# Patient Record
Sex: FEMALE | Race: WHITE | NOT HISPANIC OR LATINO | Employment: FULL TIME | URBAN - METROPOLITAN AREA
[De-identification: names, ages, dates, MRNs, and addresses within clinical notes are randomized per-mention and may not be internally consistent; named-entity substitution may affect disease eponyms.]

---

## 2017-01-26 ENCOUNTER — OFFICE VISIT (OUTPATIENT)
Dept: SLEEP MEDICINE | Facility: CLINIC | Age: 42
End: 2017-01-26
Payer: COMMERCIAL

## 2017-01-26 ENCOUNTER — LAB VISIT (OUTPATIENT)
Dept: LAB | Facility: OTHER | Age: 42
End: 2017-01-26
Attending: NURSE PRACTITIONER
Payer: MEDICAID

## 2017-01-26 ENCOUNTER — HOSPITAL ENCOUNTER (EMERGENCY)
Facility: OTHER | Age: 42
Discharge: HOME | End: 2017-01-26
Attending: EMERGENCY MEDICINE
Payer: MEDICAID

## 2017-01-26 VITALS
WEIGHT: 149.94 LBS | HEART RATE: 72 BPM | BODY MASS INDEX: 27.59 KG/M2 | SYSTOLIC BLOOD PRESSURE: 126 MMHG | HEIGHT: 62 IN | DIASTOLIC BLOOD PRESSURE: 82 MMHG

## 2017-01-26 VITALS
OXYGEN SATURATION: 99 % | HEART RATE: 78 BPM | HEIGHT: 62 IN | SYSTOLIC BLOOD PRESSURE: 127 MMHG | BODY MASS INDEX: 26.13 KG/M2 | DIASTOLIC BLOOD PRESSURE: 72 MMHG | WEIGHT: 142 LBS | RESPIRATION RATE: 18 BRPM | TEMPERATURE: 98 F

## 2017-01-26 DIAGNOSIS — E07.9 THYROID DISEASE: ICD-10-CM

## 2017-01-26 DIAGNOSIS — Z86.018 HISTORY OF UTERINE FIBROID: ICD-10-CM

## 2017-01-26 DIAGNOSIS — D50.9 IRON DEFICIENCY ANEMIA, UNSPECIFIED IRON DEFICIENCY ANEMIA TYPE: ICD-10-CM

## 2017-01-26 DIAGNOSIS — G43.009 NONINTRACTABLE MIGRAINE, UNSPECIFIED MIGRAINE TYPE: ICD-10-CM

## 2017-01-26 DIAGNOSIS — N95.9 UNSPECIFIED MENOPAUSAL AND POSTMENOPAUSAL DISORDER: Primary | ICD-10-CM

## 2017-01-26 DIAGNOSIS — Z87.42 HISTORY OF OVARIAN CYST: ICD-10-CM

## 2017-01-26 DIAGNOSIS — G43.101 MIGRAINE WITH AURA AND WITH STATUS MIGRAINOSUS, NOT INTRACTABLE: ICD-10-CM

## 2017-01-26 DIAGNOSIS — E07.9 THYROID DISEASE: Primary | ICD-10-CM

## 2017-01-26 DIAGNOSIS — G47.30 UNSPECIFIED SLEEP APNEA: ICD-10-CM

## 2017-01-26 LAB
25(OH)D3+25(OH)D2 SERPL-MCNC: 22 NG/ML
ALBUMIN SERPL BCP-MCNC: 4.1 G/DL
ALP SERPL-CCNC: 68 U/L
ALT SERPL W/O P-5'-P-CCNC: 10 U/L
ANION GAP SERPL CALC-SCNC: 8 MMOL/L
AST SERPL-CCNC: 14 U/L
BASOPHILS # BLD AUTO: 0.01 K/UL
BASOPHILS NFR BLD: 0.2 %
BILIRUB SERPL-MCNC: 0.3 MG/DL
BUN SERPL-MCNC: 13 MG/DL
CALCIUM SERPL-MCNC: 9.1 MG/DL
CHLORIDE SERPL-SCNC: 103 MMOL/L
CO2 SERPL-SCNC: 26 MMOL/L
CREAT SERPL-MCNC: 0.8 MG/DL
DIFFERENTIAL METHOD: ABNORMAL
EOSINOPHIL # BLD AUTO: 0.1 K/UL
EOSINOPHIL NFR BLD: 1.3 %
ERYTHROCYTE [DISTWIDTH] IN BLOOD BY AUTOMATED COUNT: 18.2 %
EST. GFR  (AFRICAN AMERICAN): >60 ML/MIN/1.73 M^2
EST. GFR  (NON AFRICAN AMERICAN): >60 ML/MIN/1.73 M^2
GLUCOSE SERPL-MCNC: 88 MG/DL
HCT VFR BLD AUTO: 35.8 %
HGB BLD-MCNC: 10.5 G/DL
LYMPHOCYTES # BLD AUTO: 1.8 K/UL
LYMPHOCYTES NFR BLD: 28.3 %
MCH RBC QN AUTO: 20.6 PG
MCHC RBC AUTO-ENTMCNC: 29.3 %
MCV RBC AUTO: 70 FL
MONOCYTES # BLD AUTO: 0.8 K/UL
MONOCYTES NFR BLD: 12 %
NEUTROPHILS # BLD AUTO: 3.6 K/UL
NEUTROPHILS NFR BLD: 58 %
PLATELET # BLD AUTO: 457 K/UL
PMV BLD AUTO: 9.8 FL
POTASSIUM SERPL-SCNC: 4 MMOL/L
PROT SERPL-MCNC: 7.2 G/DL
RBC # BLD AUTO: 5.09 M/UL
SODIUM SERPL-SCNC: 137 MMOL/L
TSH SERPL DL<=0.005 MIU/L-ACNC: 1.37 UIU/ML
WBC # BLD AUTO: 6.26 K/UL

## 2017-01-26 PROCEDURE — 80053 COMPREHEN METABOLIC PANEL: CPT

## 2017-01-26 PROCEDURE — 1159F MED LIST DOCD IN RCRD: CPT | Mod: S$GLB,,, | Performed by: NURSE PRACTITIONER

## 2017-01-26 PROCEDURE — 99283 EMERGENCY DEPT VISIT LOW MDM: CPT

## 2017-01-26 PROCEDURE — 99204 OFFICE O/P NEW MOD 45 MIN: CPT | Mod: S$GLB,,, | Performed by: NURSE PRACTITIONER

## 2017-01-26 PROCEDURE — 99999 PR PBB SHADOW E&M-EST. PATIENT-LVL IV: CPT | Mod: PBBFAC,,, | Performed by: NURSE PRACTITIONER

## 2017-01-26 PROCEDURE — 82306 VITAMIN D 25 HYDROXY: CPT

## 2017-01-26 PROCEDURE — 85025 COMPLETE CBC W/AUTO DIFF WBC: CPT

## 2017-01-26 PROCEDURE — 36415 COLL VENOUS BLD VENIPUNCTURE: CPT

## 2017-01-26 PROCEDURE — 84443 ASSAY THYROID STIM HORMONE: CPT

## 2017-01-26 RX ORDER — TOPIRAMATE 25 MG/1
25 TABLET ORAL AS DIRECTED
Qty: 90 TABLET | Refills: 1 | Status: SHIPPED | OUTPATIENT
Start: 2017-01-26 | End: 2019-05-05

## 2017-01-26 RX ORDER — BUTALBITAL, ACETAMINOPHEN AND CAFFEINE 50; 325; 40 MG/1; MG/1; MG/1
1 TABLET ORAL EVERY 6 HOURS PRN
Qty: 12 TABLET | Refills: 0 | Status: SHIPPED | OUTPATIENT
Start: 2017-01-26 | End: 2017-02-25

## 2017-01-26 RX ORDER — IBUPROFEN 800 MG/1
800 TABLET ORAL EVERY 6 HOURS PRN
Qty: 12 TABLET | Refills: 0 | Status: SHIPPED | OUTPATIENT
Start: 2017-01-26

## 2017-01-26 RX ORDER — ONDANSETRON 8 MG/1
8 TABLET, ORALLY DISINTEGRATING ORAL EVERY 12 HOURS PRN
Qty: 30 TABLET | Refills: 3 | Status: SHIPPED | OUTPATIENT
Start: 2017-01-26 | End: 2017-02-25

## 2017-01-26 RX ORDER — KETOPROFEN 75 MG/1
75 CAPSULE ORAL EVERY 8 HOURS PRN
Qty: 30 CAPSULE | Refills: 3 | Status: SHIPPED | OUTPATIENT
Start: 2017-01-26 | End: 2017-02-25

## 2017-01-26 NOTE — ED AVS SNAPSHOT
[facility]  [address] [facility] [address]               Crista Reyes Diamond   2017  7:01 AM   ED    Description:  Female : 1975   Department:  [facility]           Your Care was Coordinated By:     Provider Role From To    Emre Gilliland MD Attending Provider 17 0640 --      Reason for Visit     Migraine           Diagnoses this Visit        Comments    Unspecified menopausal and postmenopausal disorder    -  Primary     Nonintractable migraine, unspecified migraine type         History of uterine fibroid         History of ovarian cyst           ED Disposition     None           To Do List           Follow-up Information     Follow up with [facility] In 2 days.    Specialty:  Obstetrics and Gynecology    Contact information:    [address] [place] [id number] [phone]        Follow up with [facility] In 2 days.    Specialty:  Neurology    Contact information:    [address] [place] [id number] [phone]    Additional information:    [facility], 8th Floor, [address]   Please park in [facility].       These Medications        Disp Refills Start End    ibuprofen (ADVIL,MOTRIN) 800 MG tablet 12 tablet 0 2017     Take 1 tablet (800 mg total) by mouth every 6 (six) hours as needed (Migraine Headaches). - Oral    butalbital-acetaminophen-caffeine -40 mg (FIORICET, ESGIC) -40 mg per tablet 12 tablet 0 2017    Take 1 tablet by mouth every 6 (six) hours as needed for Headaches. - Oral      [facility]     [facility] -  Assistance  Registered nurses in the [facility] provide clinical advisement, health education, appointment booking, and other advisory services.  Call for this free service at [phone].             Medications           Message regarding  "Medications     Verify the changes and/or additions to your medication regime listed below are the same as discussed with your clinician today.  If any of these changes or additions are incorrect, please notify your healthcare provider.        START taking these NEW medications        Refills    ibuprofen (ADVIL,MOTRIN) 800 MG tablet 0    Sig: Take 1 tablet (800 mg total) by mouth every 6 (six) hours as needed (Migraine Headaches).    Class: Print    Route: Oral    butalbital-acetaminophen-caffeine -40 mg (FIORICET, ESGIC) -40 mg per tablet 0    Sig: Take 1 tablet by mouth every 6 (six) hours as needed for Headaches.    Class: Print    Route: Oral      STOP taking these medications     ondansetron (ZOFRAN) 4 MG tablet Take 1 tablet (4 mg total) by mouth every 8 (eight) hours as needed for Nausea.           Verify that the below list of medications is an accurate representation of the medications you are currently taking.  If none reported, the list may be blank. If incorrect, please contact your healthcare provider. Carry this list with you in case of emergency.           Current Medications     ALBUTEROL INHL Inhale into the lungs.    butalbital-acetaminophen-caffeine -40 mg (FIORICET, ESGIC) -40 mg per tablet Take 1 tablet by mouth every 6 (six) hours as needed for Headaches.    ibuprofen (ADVIL,MOTRIN) 800 MG tablet Take 1 tablet (800 mg total) by mouth every 6 (six) hours as needed (Migraine Headaches).           Clinical Reference Information           Your Vitals Were     BP Pulse Temp Resp Height Weight    131/85 (BP Location: Left arm, Patient Position: Sitting) 84 98.1 °F (36.7 °C) (Oral) 18 5' 2" (1.575 m) 64.4 kg (142 lb)    Last Period SpO2 BMI          01/19/2017 99% 25.97 kg/m2        Allergies as of 1/26/2017        Reactions    Sulfa (Sulfonamide Antibiotics) Anaphylaxis      Immunizations Administered on Date of Encounter - 1/26/2017     None      ED Micro, Lab, POCT     None "      ED Imaging Orders     None      Discharge References/Attachments     ESTROGEN LEVELS, LOW: EFFECTS OF MENOPAUSE (ENGLISH)    HORMONE THERAPY (HT) FOR WOMEN, DECIDING ABOUT (ENGLISH)    MENOPAUSE, HORMONE CHANGES DURING (ENGLISH)    FIBROIDS, WHAT ARE (ENGLISH)    UTERINE FIBROIDS (ENGLISH)    HEADACHE, MIGRAINE: STAGES AND TREATMENT (ENGLISH)    HEADACHES, SELF-CARE FOR (ENGLISH)    OVARIAN CYSTS, UNDERSTANDING (ENGLISH)    OVARIAN CYSTS, TREATMENT FOR  (ENGLISH)      [facility] Sign-Up     Activating your [facility] account is as easy as 1-2-3!     1) Visit [email or web address], select Sign Up Now, enter this activation code and your date of birth, then select Next.  [email or web address]  Expires: 3/12/2017  7:35 AM      2) Create a username and password to use when you visit [facility] in the future and select a security question in case you lose your password and select Next.    3) Enter your e-mail address and click Sign Up!    Additional Information  If you have questions, please e-mail [email or web address] or call [phone] to talk to our [facility] staff. Remember, [facility] is NOT to be used for urgent needs. For medical emergencies, dial 911.          [facility] complies with applicable Federal civil rights laws and does not discriminate on the basis of race, color, national origin, age, disability, or sex.        Language Assistance Services     ATTENTION: Language assistance services are available, free of charge. Please call [phone].      ATENCIÓN: Si habla kelley, tiene a rivas disposición servicios gratuitos de asistencia lingüística. Llame al [phone].     CHÚ Ý: N?u b?n nói Ti?ng Vi?t, có các d?ch v? h? tr? ngôn ng? mi?n phí dành cho b?n. G?i s? [phone].

## 2017-01-26 NOTE — ED PROVIDER NOTES
"Encounter Date: 1/26/2017    SCRIBE #1 NOTE: I, Jaymie Alvarado, am scribing for, and in the presence of, Dr. Gilliland.       History     Chief Complaint   Patient presents with    Migraine     pt c/o migraine, chills, n/v, abd pain for last months with symptoms worsening.      Review of patient's allergies indicates:   Allergen Reactions    Sulfa (sulfonamide antibiotics) Anaphylaxis     HPI Comments:   Time seen by provider: 7:18 AM    The patient is a 41 y.o. female with uterine fibroids and ovarian cysts who presents to the ED with an acute onset of worsening intermittent HA over the last 3 months with associated chills, nausea, vomiting, and left eye visual changes.  Patient is currently headache free.  Patient states she has no abdominal pain or pelvic pain at this time.  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  The patient is requesting resources were she can find help. The patient denies history of migraines, seeing a PCP for symptoms, poor eating habits, or any other symptoms at this time. No pertinent SHx noted. Sulfa drug allergy noted.    The history is provided by the patient.     Past Medical History   Diagnosis Date    Asthma     Borderline anemia     Fibroids     Fibroids     Functional ovarian cysts      Past Medical History Pertinent Negatives   Diagnosis Date Noted    Diabetes mellitus 1/9/2015    Hypertension 1/9/2015    Renal disorder 1/9/2015    Seizures 1/9/2015     Past Surgical History   Procedure Laterality Date    D&c hysteroscopy with endometrial ablation  2012     Family History   Problem Relation Age of Onset    Emphysema Mother     Hypertension Father     Cancer Sister     Breast cancer Neg Hx     Colon cancer Neg Hx     Ovarian cancer Neg Hx      Social History   Substance Use Topics    Smoking status: Never Smoker    Smokeless tobacco: None    Alcohol use No     Review " of Systems   Constitutional: Positive for chills. Negative for fever.   HENT: Negative for congestion, rhinorrhea, sneezing and sore throat.    Eyes: Positive for visual disturbance.   Respiratory: Negative for cough and shortness of breath.    Cardiovascular: Negative for chest pain and palpitations.   Gastrointestinal: Positive for nausea and vomiting. Negative for abdominal pain and diarrhea.   Genitourinary: Negative for dysuria and hematuria.   Musculoskeletal: Negative for back pain and neck pain.   Skin: Negative for rash.   Neurological: Positive for headaches. Negative for seizures and syncope.     Physical Exam   Initial Vitals   BP Pulse Resp Temp SpO2   01/26/17 0638 01/26/17 0638 01/26/17 0638 01/26/17 0638 01/26/17 0638   131/85 84 18 98.1 °F (36.7 °C) 99 %     Physical Exam    Nursing note and vitals reviewed.  Constitutional: She appears well-developed and well-nourished. She is not diaphoretic. No distress.   HENT:   Head: Normocephalic and atraumatic.   Mouth/Throat: Oropharynx is clear and moist.   Eyes: Conjunctivae and EOM are normal. Pupils are equal, round, and reactive to light. No scleral icterus.   Neck: Normal range of motion. Neck supple.   Cardiovascular: Normal rate, regular rhythm, S1 normal, S2 normal and normal heart sounds. Exam reveals no gallop and no friction rub.    No murmur heard.  Pulmonary/Chest: Breath sounds normal. No respiratory distress. She has no wheezes. She has no rhonchi. She has no rales.   Abdominal: Soft. Bowel sounds are normal. There is no tenderness. There is no rebound and no guarding.   Musculoskeletal: Normal range of motion. She exhibits no edema or tenderness.   No lower extremity edema.    Lymphadenopathy:     She has no cervical adenopathy.   Neurological: She is alert and oriented to person, place, and time.   Skin: Skin is warm and dry. No rash noted. No pallor.   Psychiatric: She has a normal mood and affect. Her behavior is normal. Judgment and  thought content normal.       ED Course   Procedures  Labs Reviewed - No data to display.        Medical Decision Making:   History:   Old Medical Records: I decided to obtain old medical records.            Scribe Attestation:   Scribe #1: I performed the above scribed service and the documentation accurately describes the services I performed. I attest to the accuracy of the note.    Attending Attestation:           Physician Attestation for Scribe:  Physician Attestation Statement for Scribe #1: I, Dr. Gilliland, reviewed documentation, as scribed by Jaymie Alvarado in my presence, and it is both accurate and complete.         Attending ED Notes:   Urgent evaluation a 41-year-old female with past medical history of uterine fibroids, ovarian cyst and intermittent migraine headaches presents the ED with requesting help how to treat all of her symptoms when they occur.  Patient is currently headache free, abdominal pain-free and pelvic pain-free.  Physical exam is benign.  Abdominal exam is benign.  Patient is neurovascular be intact without focal neurologic deficits.PERRLA, EOMI.  Strength 5 of 5 throughout.  Two point discrimination is intact throughout.  Sensation intact to light touch throughout.  Reflexes 2+ throughout.  Good finger to nose task ability. Negative pronator drift.  No papilledema.  No meningeal signs.  The patient is extensively counseled on her diagnosis and treatment, discharged in good condition and directed to follow-up with gynecology, neurology and her PCP in the next 24-48 hours.          ED Course     Clinical Impression:     1. Unspecified menopausal and postmenopausal disorder    2. Nonintractable migraine, unspecified migraine type    3. History of uterine fibroid    4. History of ovarian cyst          Disposition:   Disposition: Discharged  Condition: Stable       Emre Gilliland MD  01/27/17 0598

## 2017-01-26 NOTE — MR AVS SNAPSHOT
[facility], [place], operated by [facility]  [address]  [facility] [address]  Phone: [phone]                  Crista Reyes Diamond   2017 10:00 AM   Office Visit    Description:  Female : 1975   Provider:  Christen Bishop NP   Department:  [facility] [place], operated by [facility]           Diagnoses this Visit        Comments    Thyroid disease    -  Primary     Migraine with aura and with status migrainosus, not intractable         Iron deficiency anemia, unspecified iron deficiency anemia type         Unspecified sleep apnea                To Do List           Future Appointments        Provider Department Dept Phone    2017 11:00 AM LAB, [facility] [phone]    2017 8:45 AM Rosio Elizabeth MD [facility] [place], operated [facility] [address] [phone]    2/3/2017 8:00 AM [facility] MRI1 [address] LIMIT [facility] [phone]    2/3/2017 9:00 AM [facility] MRI1 350 LB LIMIT [facility] [phone]      Goals (5 Years of Data)     None      Follow-Up and Disposition     Return in about 2 months (around 3/28/2017) for 8a headache.       These Medications        Disp Refills Start End    ketoprofen (ORUDIS) 75 MG capsule 30 capsule 3 2017    Take 1 capsule (75 mg total) by mouth every 8 (eight) hours as needed for Pain. - Oral    Pharmacy: [facility] [address] [place] [address] Ph #: [phone]       ondansetron (ZOFRAN-ODT) 8 MG TbDL 30 tablet 3 2017    Take 1 tablet (8 mg total) by mouth every 12 (twelve) hours as needed. - Oral    Pharmacy: [facility] [address] [place] [address] [address] [facility] Ph #: [phone]       topiramate (TOPAMAX) 25 MG tablet 90 tablet 1 2017    Take 1 tablet (25 mg total) by mouth as directed. 1 tab x 7d then 2 tabs qd x 7d, then 3 tabs qd thereafter - Oral    Pharmacy: [id number] - [place], [place] - [address]  [facility] #: [phone]         [facility] On Call     [facility] On Call Nurse Care Line - 24/7 Assistance  Registered nurses in the [facility] provide clinical advisement, health education, appointment booking, and other advisory services.  Call for this free service at [phone].             Medications           Message regarding Medications     Verify the changes and/or additions to your medication regime listed below are the same as discussed with your clinician today.  If any of these changes or additions are incorrect, please notify your healthcare provider.        START taking these NEW medications        Refills    ketoprofen (ORUDIS) 75 MG capsule 3    Sig: Take 1 capsule (75 mg total) by mouth every 8 (eight) hours as needed for Pain.    Class: Normal    Route: Oral    ondansetron (ZOFRAN-ODT) 8 MG TbDL 3    Sig: Take 1 tablet (8 mg total) by mouth every 12 (twelve) hours as needed.    Class: Normal    Route: Oral    topiramate (TOPAMAX) 25 MG tablet 1    Sig: Take 1 tablet (25 mg total) by mouth as directed. 1 tab x 7d then 2 tabs qd x 7d, then 3 tabs qd thereafter    Class: Normal    Route: Oral           Verify that the below list of medications is an accurate representation of the medications you are currently taking.  If none reported, the list may be blank. If incorrect, please contact your healthcare provider. Carry this list with you in case of emergency.           Current Medications     ALBUTEROL INHL Inhale into the lungs.    butalbital-acetaminophen-caffeine -40 mg (FIORICET, ESGIC) -40 mg per tablet Take 1 tablet by mouth every 6 (six) hours as needed for Headaches.    ibuprofen (ADVIL,MOTRIN) 800 MG tablet Take 1 tablet (800 mg total) by mouth every 6 (six) hours as needed (Migraine Headaches).    ketoprofen (ORUDIS) 75 MG capsule Take 1 capsule (75 mg total) by mouth every 8 (eight) hours as needed for Pain.    ondansetron (ZOFRAN-ODT) 8 MG TbDL Take 1 tablet (8  "mg total) by mouth every 12 (twelve) hours as needed.    topiramate (TOPAMAX) 25 MG tablet Take 1 tablet (25 mg total) by mouth as directed. 1 tab x 7d then 2 tabs qd x 7d, then 3 tabs qd thereafter           Clinical Reference Information           Vital Signs - Last Recorded  Most recent update: 1/26/2017  9:51 AM by Emerita Chun MA    BP Pulse Ht Wt LMP BMI    126/82 72 5' 2" (1.575 m) 68 kg (149 lb 14.6 oz) 01/19/2017 27.42 kg/m2      Blood Pressure          Most Recent Value    BP  126/82      Allergies as of 1/26/2017     Sulfa (Sulfonamide Antibiotics)      Immunizations Administered on Date of Encounter - 1/26/2017     None      Orders Placed During Today's Visit     Future Labs/Procedures Expected by Expires    CBC auto differential  1/26/2017 3/27/2018    Comprehensive metabolic panel  1/26/2017 3/27/2018    MRA Brain with contrast  1/26/2017 1/26/2018    MRI Brain W WO Contrast  1/26/2017 1/26/2018    TSH  1/26/2017 3/27/2018    VITAMIN D  1/26/2017 3/27/2018    Home Sleep Studies  As directed 1/26/2018      [facility] Sign-Up     Activating your [facility] account is as easy as 1-2-3!     1) Visit [email or web address], select Sign Up Now, enter this activation code and your date of birth, then select Next.  [email or web address]  Expires: 3/12/2017  7:35 AM      2) Create a username and password to use when you visit [facility] in the future and select a security question in case you lose your password and select Next.    3) Enter your e-mail address and click Sign Up!    Additional Information  If you have questions, please e-mail [email or web address] or call [phone] to talk to our [facility] staff. Remember, [facility] is NOT to be used for urgent needs. For medical emergencies, dial 911.         Instructions      Self-Care for Headaches  Most headaches aren't serious and can be relieved with self-care. But some headaches may be a sign of another health problem like eye trouble or high blood pressure. To find " "the best treatment, learn what kind of headaches you get. For tension headaches, self-care will usually help. To treat migraines, ask your healthcare provider for advice. It is also possible to get both tension and migraine headaches. Self-care involves relieving the pain and avoiding headache triggers if you can.    Ways to reduce pain and tension  Try these steps:  · Apply a cold compress or ice pack to the pain site.  · Drink fluids. If nausea makes it hard to drink, try sucking on ice.  · Rest. Protect yourself from bright light and loud noises.  · Calm your emotions by imagining a peaceful scene.  · Massage tight neck, shoulder, and head muscles.  · To relax muscles, soak in a hot bath or use a hot shower.  Use medicines  Aspirin or aspirin substitutes, such as ibuprofen and acetaminophen, can relieve headache. Remember: Never give aspirin to anyone 18 years old or younger because of the risk of developing Reye syndrome. Use pain medicines only when necessary.  Track your headaches  Keeping a headache diary can help you and your healthcare provider identify what's causing your headaches:  · Note when each headache happens.  · Identify your activities and the foods you've eaten 6 to 8 hours before the headache began.  · Look for any trends or "triggers."  Signs of tension headache  Any of the following can be signs:  · Dull pain or feeling of pressure in a tight band around your head  · Pain in your neck or shoulders  · Headache without a definite beginning or end  · Headache after an activity such as driving or working on a computer  Signs of migraine  Any of the following can be signs:  · Throbbing pain on one or both sides of your head  · Nausea or vomiting  · Extreme sensitivity to light, sound, and smells  · Bright spots, flashes, or other visual changes  · Pain or nausea so severe that you can't continue your daily activities  Call your healthcare provider   If you have any of the following symptoms, " "contact your healthcare provider:  · A headache that lingers after a recent injury or bump to the head.  · A fever with a stiff neck or pain when you bend your head toward your chest.  · A headache along with slurred speech, changes in your vision, or numbness or weakness in your arms or legs.  · A headache for longer than 3 days.  · Frequent headaches, especially in the morning.  · Headaches with seizures   · Seek immediate medical attention if you have a headache that you would call "the worst headache you have ever had."   © 3663-5515 [employer]. [address] [place] [address]. All rights reserved. This information is not intended as a substitute for professional medical care. Always follow your healthcare professional's instructions.        Migraine Headache  This often severe type of headache is different from other types of headaches in that symptoms other than pain occur with the headache. Nausea and vomiting, lightheadedness, sensitivity to light (photophobia), and other visual disturbances are common migraine symptoms. The pain may last from a few hours to several days. It is not clear why migraines occur but transient factors called triggers can raise the risk of having a migraine attack. A migraine may be triggered by emotional stress or depression, or by hormone changes during the menstrual cycle. Other triggers include birth control pills, overuse of migraine medicines, alcohol or caffeine, foods with tyramine, eye strain, weather changes, missed meals, or too little or too much sleep.  Home care  Follow these tips when taking care of yourself at home:  · Dont drive yourself home if you were given pain medicine for your headache. Instead, have someone else drive you home. Try to sleep when you get home. You should feel much better when you wake up.  · Cold can help ease migraine symptoms. Put an ice pack on your forehead or at the base of your skull. Put heat on the back of " your neck to help ease any neck spasm.  · Drink only clear liquids or eat a light diet until your symptoms get better. This will help you avoid nausea and vomiting.  How to prevent migraines  Pay attention to what seems to trigger your headache. Try to avoid the triggers when you can. If you have frequent headaches, consider keeping a headache diary. In it, write down what you were doing, feeling, or eating in the hours before each headache. Show this to your health care provider to help find the cause of your headaches.  If stress seems to be a trigger for your headaches, figure out what is causing stress in your life. Learn new ways to handle your stress. Ideas include regular exercise, biofeedback, self-hypnosis, and meditation. Talk with your health care provider to find out more information about managing stress. Many books and digital media are also available on this subject.  Tyramine is a substance found in many foods. It can trigger a migraine in some people. These foods contain tyramine:  · Chocolate  · Yogurt  · All cheeses but cottage cheese and cream cheese  · Smoked or pickled fish and meat, including herring, caviar, bologna, pepperoni, and salami  · Liver  · Avocados  · Bananas  · Figs  · Raisins  · Red wine  Try staying away from these foods for 1 to 2 months to see if you have fewer headaches.  How to treat future headaches  · Take time out at the first sign of a headache, if possible. Find a quiet, dark, comfortable place to sit or lie down. Let yourself relax or sleep.  · Put an ice pack on your forehead or on the area of greatest pain. A heating pad and massage may help if you are having a muscle spasm and tightness in your neck.  · If you have been prescribed a medicine to stop a migraine headache, use this at the first warning sign of the headache for best results. First signs may be an aura or pain.  · If you need to take medicine often for your migraine, talk with your healthcare provider  about other ways to prevent your headaches.  Follow-up care  Follow up with your healthcare provider if your headache doesnt get better within the next 24 hours. Talk with your provider if you have frequent headaches. He or she can figure out a treatment plan. Ask if you can have medicine to take at home the next time you get a bad headache. This may keep you from having to visit the emergency department in the future. You may need to see a headache specialist (neurologist) if you continue to have headaches.  When to seek medical advice  Call your healthcare provider right away if any of these occur:  · Your head pain gets worse, or doesnt get better within 24 hours  · You cant keep liquids down (repeated vomiting)  · Pain in your sinuses, ears, or throat  · Fever of 100.4º F (38º C) or higher, or as directed by your healthcare provider  · Stiff neck  · Extreme drowsiness, confusion, or fainting  · Dizziness, or dizziness with spinning sensation (vertigo)  · Weakness in an arm or leg, or on one side of your face  · Difficulty talking or seeing  © 3113-0982 [employer]. [address] [place] [address]. All rights reserved. This information is not intended as a substitute for professional medical care. Always follow your healthcare professional's instructions.        Preventing Migraine Headaches: Medicines and Lifestyle Changes  A migraine is a type of severe headache. Having a migraine can be very painful. But there are steps you can take to help prevent migraines.    Medicines to help prevent migraines  · Your healthcare provider may prescribe certain medicines to help prevent migraines. These medicines may need to be taken daily. Or they may only need to be taken at times when youre likely to have a migraine.  · Common medicines used to help prevent migraines include:  ¨ Triptans (serotonin receptor agonists)  ¨ Nonsteroidal anti-inflammatory drugs (available  over-the-counter)  ¨ Beta-blockers  ¨ Anticonvulsants  ¨ Tricyclic antidepressants  ¨ Calcium channel blockers  ¨ Certain vitamins, minerals, and plant extracts  ¨ Botulinum toxin injection (Botox) for certain chronic migraines   ¨ CGRP (calcitonin gene-related peptide) agnonists are being reviewed by the Food and Drug Administration (FDA)  Lifestyle changes for long-term prevention  Here are some suggestions:  · Exercise. Regular exercise can help prevent migraines and improve your health. (If exercise triggers your migraines, talk to your healthcare provider.)  · Keep regular habits. Dont skip or delay meals. Drink plenty of water. And go to bed and get up at about the same time each day. This includes weekends.  · Try alternative treatments. These are treatments that do not involve the use of medicines or surgery. They may help relieve symptoms and prevent migraines. Some treatment options include biofeedback and acupuncture. Ask your healthcare provider to tell you more about these treatments if you have questions.  · Limit caffeine. You may find that caffeine helps relieve pain during an attack. But too much caffeine can also trigger migraines. So, limit the amount of caffeine you consume.  © 8660-3712 [employer]. [address] [place] [address], [place] [address]. All rights reserved. This information is not intended as a substitute for professional medical care. Always follow your healthcare professional's instructions.

## 2017-01-26 NOTE — PATIENT INSTRUCTIONS
"  Self-Care for Headaches  Most headaches aren't serious and can be relieved with self-care. But some headaches may be a sign of another health problem like eye trouble or high blood pressure. To find the best treatment, learn what kind of headaches you get. For tension headaches, self-care will usually help. To treat migraines, ask your healthcare provider for advice. It is also possible to get both tension and migraine headaches. Self-care involves relieving the pain and avoiding headache triggers if you can.    Ways to reduce pain and tension  Try these steps:  · Apply a cold compress or ice pack to the pain site.  · Drink fluids. If nausea makes it hard to drink, try sucking on ice.  · Rest. Protect yourself from bright light and loud noises.  · Calm your emotions by imagining a peaceful scene.  · Massage tight neck, shoulder, and head muscles.  · To relax muscles, soak in a hot bath or use a hot shower.  Use medicines  Aspirin or aspirin substitutes, such as ibuprofen and acetaminophen, can relieve headache. Remember: Never give aspirin to anyone 18 years old or younger because of the risk of developing Reye syndrome. Use pain medicines only when necessary.  Track your headaches  Keeping a headache diary can help you and your healthcare provider identify what's causing your headaches:  · Note when each headache happens.  · Identify your activities and the foods you've eaten 6 to 8 hours before the headache began.  · Look for any trends or "triggers."  Signs of tension headache  Any of the following can be signs:  · Dull pain or feeling of pressure in a tight band around your head  · Pain in your neck or shoulders  · Headache without a definite beginning or end  · Headache after an activity such as driving or working on a computer  Signs of migraine  Any of the following can be signs:  · Throbbing pain on one or both sides of your head  · Nausea or vomiting  · Extreme sensitivity to light, sound, and " "smells  · Bright spots, flashes, or other visual changes  · Pain or nausea so severe that you can't continue your daily activities  Call your healthcare provider   If you have any of the following symptoms, contact your healthcare provider:  · A headache that lingers after a recent injury or bump to the head.  · A fever with a stiff neck or pain when you bend your head toward your chest.  · A headache along with slurred speech, changes in your vision, or numbness or weakness in your arms or legs.  · A headache for longer than 3 days.  · Frequent headaches, especially in the morning.  · Headaches with seizures   · Seek immediate medical attention if you have a headache that you would call "the worst headache you have ever had."   © 6052-3005 [employer]. [address] [place] [address], [place] [address]. All rights reserved. This information is not intended as a substitute for professional medical care. Always follow your healthcare professional's instructions.        Migraine Headache  This often severe type of headache is different from other types of headaches in that symptoms other than pain occur with the headache. Nausea and vomiting, lightheadedness, sensitivity to light (photophobia), and other visual disturbances are common migraine symptoms. The pain may last from a few hours to several days. It is not clear why migraines occur but transient factors called triggers can raise the risk of having a migraine attack. A migraine may be triggered by emotional stress or depression, or by hormone changes during the menstrual cycle. Other triggers include birth control pills, overuse of migraine medicines, alcohol or caffeine, foods with tyramine, eye strain, weather changes, missed meals, or too little or too much sleep.  Home care  Follow these tips when taking care of yourself at home:  · Dont drive yourself home if you were given pain medicine for your headache. Instead, have someone else drive you home. " Try to sleep when you get home. You should feel much better when you wake up.  · Cold can help ease migraine symptoms. Put an ice pack on your forehead or at the base of your skull. Put heat on the back of your neck to help ease any neck spasm.  · Drink only clear liquids or eat a light diet until your symptoms get better. This will help you avoid nausea and vomiting.  How to prevent migraines  Pay attention to what seems to trigger your headache. Try to avoid the triggers when you can. If you have frequent headaches, consider keeping a headache diary. In it, write down what you were doing, feeling, or eating in the hours before each headache. Show this to your health care provider to help find the cause of your headaches.  If stress seems to be a trigger for your headaches, figure out what is causing stress in your life. Learn new ways to handle your stress. Ideas include regular exercise, biofeedback, self-hypnosis, and meditation. Talk with your health care provider to find out more information about managing stress. Many books and digital media are also available on this subject.  Tyramine is a substance found in many foods. It can trigger a migraine in some people. These foods contain tyramine:  · Chocolate  · Yogurt  · All cheeses but cottage cheese and cream cheese  · Smoked or pickled fish and meat, including herring, caviar, bologna, pepperoni, and salami  · Liver  · Avocados  · Bananas  · Figs  · Raisins  · Red wine  Try staying away from these foods for 1 to 2 months to see if you have fewer headaches.  How to treat future headaches  · Take time out at the first sign of a headache, if possible. Find a quiet, dark, comfortable place to sit or lie down. Let yourself relax or sleep.  · Put an ice pack on your forehead or on the area of greatest pain. A heating pad and massage may help if you are having a muscle spasm and tightness in your neck.  · If you have been prescribed a medicine to stop a migraine  headache, use this at the first warning sign of the headache for best results. First signs may be an aura or pain.  · If you need to take medicine often for your migraine, talk with your healthcare provider about other ways to prevent your headaches.  Follow-up care  Follow up with your healthcare provider if your headache doesnt get better within the next 24 hours. Talk with your provider if you have frequent headaches. He or she can figure out a treatment plan. Ask if you can have medicine to take at home the next time you get a bad headache. This may keep you from having to visit the emergency department in the future. You may need to see a headache specialist (neurologist) if you continue to have headaches.  When to seek medical advice  Call your healthcare provider right away if any of these occur:  · Your head pain gets worse, or doesnt get better within 24 hours  · You cant keep liquids down (repeated vomiting)  · Pain in your sinuses, ears, or throat  · Fever of 100.4º F (38º C) or higher, or as directed by your healthcare provider  · Stiff neck  · Extreme drowsiness, confusion, or fainting  · Dizziness, or dizziness with spinning sensation (vertigo)  · Weakness in an arm or leg, or on one side of your face  · Difficulty talking or seeing  © 0658-5699 [employer]. [address] [place] [address], [place] [address]. All rights reserved. This information is not intended as a substitute for professional medical care. Always follow your healthcare professional's instructions.        Preventing Migraine Headaches: Medicines and Lifestyle Changes  A migraine is a type of severe headache. Having a migraine can be very painful. But there are steps you can take to help prevent migraines.    Medicines to help prevent migraines  · Your healthcare provider may prescribe certain medicines to help prevent migraines. These medicines may need to be taken daily. Or they may only need to be taken at times when youre  likely to have a migraine.  · Common medicines used to help prevent migraines include:  ¨ Triptans (serotonin receptor agonists)  ¨ Nonsteroidal anti-inflammatory drugs (available over-the-counter)  ¨ Beta-blockers  ¨ Anticonvulsants  ¨ Tricyclic antidepressants  ¨ Calcium channel blockers  ¨ Certain vitamins, minerals, and plant extracts  ¨ Botulinum toxin injection (Botox) for certain chronic migraines   ¨ CGRP (calcitonin gene-related peptide) agnonists are being reviewed by the Food and Drug Administration (FDA)  Lifestyle changes for long-term prevention  Here are some suggestions:  · Exercise. Regular exercise can help prevent migraines and improve your health. (If exercise triggers your migraines, talk to your healthcare provider.)  · Keep regular habits. Dont skip or delay meals. Drink plenty of water. And go to bed and get up at about the same time each day. This includes weekends.  · Try alternative treatments. These are treatments that do not involve the use of medicines or surgery. They may help relieve symptoms and prevent migraines. Some treatment options include biofeedback and acupuncture. Ask your healthcare provider to tell you more about these treatments if you have questions.  · Limit caffeine. You may find that caffeine helps relieve pain during an attack. But too much caffeine can also trigger migraines. So, limit the amount of caffeine you consume.  © 3178-9686 [employer]. [address] [place] [address], [place] [address]. All rights reserved. This information is not intended as a substitute for professional medical care. Always follow your healthcare professional's instructions.

## 2017-01-26 NOTE — PROGRESS NOTES
"REFERRING PROVIDER: Self referred    REASON FOR CONSULT: Headaches, dc'd from ED today for headache     41/F with no history of headaches until Thanksgiving day 2016. She was suffering with bronchitis/cough/R ear infection at same time. Still has lingering cough. While at work ("slammed busy at work") she began with left eye vision loss then seeing C-shaped moving patterns followed with headache associated with nausea, vomiting, poor focus, goose bumps. She rested in dark room and excedrin migraine/motrin partially effective. Describes pain as throbbing or like my head is going to explose to at times stabbing pain, mostly beginning left side of head and can move bitemporal or posteriorly. She thought she was having a stroke or panic attack. Headaches have become almost daily. Not taking advil menstrual 2 tabs which aborts pain w/i an hour but headache always recurs same day. Begin typically during daytime, not upon awakening. Has very mild pain today. Reports +photo/phonophobia, blurred vision and occasional ringing in ears (also gets with anxiety) also with subsequent episodes over the past 2mos. Triggers identified include stress, hormonal (seeing GYN this week). She denies unilateral autonomic deficit, fever, dizziness, head injury.  Activity can worsen pain, rest improve.     Tried: lamictal remote for mood    FH: negative for headache  SH: Partner, 3 dogs, [employer] industry sales, was in [place] working up until 2 wks ago moved back. [employer] building home. No ETOH or tobacco. Hx illicit substance use/OD 2013    HIT-6 score-78 "always" to all questions  LABS 2015. No imaging done ER  .     ROS: Denies double vision, +hot flashes, +weigth fluctuations x 2 mos, anxiety worrier, clock watcher", +snoring, chronic disrupted sleep, tiredness "exhausted recently", increased stress, otherwise a balance review of 10-systems is negative.         PHYSICAL EXAM:   General: W/D, overweight, well " "groomed  Visit Vitals    /82    Pulse 72    Ht 5' 2" (1.575 m)    Wt 68 kg (149 lb 14.6 oz)    LMP 01/19/2017    BMI 27.42 kg/m2   Neurological: Awake, alert, oriented x 3. Speech fluent, no dysarthria. Good attention span. Good fund of knowledge.   CN II-XII- pupils equal, no ptosis, nystagmus, EOM palsy, papilledema. No facial asymmetry or facial sensory loss. Good hearing bilaterally. Good shoulder shrug, palatal elevation, tongue protrusion bilaterally.   Motor: 5/5 strength, normal tone/bulk in all extremities.   Sensory: Intact to light touch upper and lower extremities. No occipital or temporal tenderness, +trapezius and paracervical muscle tenderness.   Gait: Normal   Coordination: Good FTNT, Heel to shin   Neck circumference 14"      IMPRESSION:   Classic migraines, non-intractable  Daily headaches, snoring, disrupted sleep, daytime tiredness r/o TYSON        PLAN   Begin Topamax 25mg qhs with up-titration weekly goal 75mg, stay at lowest effective dose. Discussed purpose/se/    Abortive therapy:   Ketoprofen 75mg cap q8h prn  Zofran 8mg ODT q12h prn nausea and/or headache  Sparing Fioricet (ED today) prn  Avoidt triptans until imaging done    Encouraged to eat regular meals, get adequate sleep, avoid known triggers, and reduce stress. Stay well hydrated. She is not working currently.     Home sleep test, discussed plan of care. Discussed etiology of TYSON, potential implications of untreated sleep apnea, plan phone results.     Check CMP, Vit D, CBC, TSH other etiological factors.   MRI/MRA w/w/o contrast assess mass/vascular/infection/lesion    See ophthalmologist eye exam    RTC 2 month, sooner if needed. Instructed how to keep HA diaries accurate monitoring      "

## 2017-01-26 NOTE — ED NOTES
Patient Identifiers for Crista Diamond checked and correct  LOC: The patient is awake, alert and aware of environment with an appropriate affect, the patient is oriented x 3 and speaking appropriate.  APPEARANCE: Patient resting comfortably and in no acute distress. The patient is clean and well groomed. The patient's clothing is properly fastened.  SKIN: The skin is warm and dry. The patient has normal skin turgor and moist mucus membranes. No rashes or lesions upon observation. Skin Intact , no breakdown noted.  Musculoskeletal :  Normal range of motion noted. Moves all extremities well.  RESPIRATORY: Airway is open and patent, respirations are spontaneous, patient has a normal effort and rate. Breath sounds are clear & equal, bilaterally.  CARDIAC: Patient has a normal rate and rhythm, no peripheral edema noted, capillary refill < 3 seconds.   PULSES: 2+ radial & pedal pulses, symmetrical in all extremities.  NEUROLOGIC: PERRL, Pupils 3 mm and reacts briskly to light. Motor strength 5/5 all extremities.  The pt's facial expression is symmetrical, patient moving all extremities, normal sensation in all extremities when touched with a finger.The patient is awake, alert and cooperative with a calm affect, patient is aware of environment.      Will continue to monitor

## 2017-01-26 NOTE — ED TRIAGE NOTES
Pt c/o left sided migraine with loss of vision in the left eye. Pt reports vision has returned by she experienced sharp stabbing pain with flashing bright light & floaters in the OS. Pt reports flashing light sensation has subsided, but accessional floaters have continued. Pt report pain has moved to occiput. Pt also c/o N/V. Pt reports that this episode started at 8 am yesterday. Pt denies Numbness/tingling in arms & legs, aphasia & loss of bowel/bladder.

## 2017-01-27 ENCOUNTER — OFFICE VISIT (OUTPATIENT)
Dept: OBSTETRICS AND GYNECOLOGY | Facility: CLINIC | Age: 42
End: 2017-01-27
Payer: COMMERCIAL

## 2017-01-27 VITALS
SYSTOLIC BLOOD PRESSURE: 120 MMHG | DIASTOLIC BLOOD PRESSURE: 70 MMHG | HEIGHT: 62 IN | BODY MASS INDEX: 27.06 KG/M2 | WEIGHT: 147.06 LBS

## 2017-01-27 DIAGNOSIS — Z98.890 S/P ENDOMETRIAL ABLATION: ICD-10-CM

## 2017-01-27 DIAGNOSIS — Z01.419 ENCOUNTER FOR GYNECOLOGICAL EXAMINATION WITHOUT ABNORMAL FINDING: ICD-10-CM

## 2017-01-27 DIAGNOSIS — D64.9 ANEMIA, UNSPECIFIED TYPE: ICD-10-CM

## 2017-01-27 DIAGNOSIS — N63.10 LUMP OF RIGHT BREAST: ICD-10-CM

## 2017-01-27 DIAGNOSIS — N94.6 DYSMENORRHEA: ICD-10-CM

## 2017-01-27 DIAGNOSIS — D25.9 UTERINE LEIOMYOMA, UNSPECIFIED LOCATION: Primary | ICD-10-CM

## 2017-01-27 LAB
B-HCG UR QL: NEGATIVE
CTP QC/QA: YES

## 2017-01-27 PROCEDURE — 99204 OFFICE O/P NEW MOD 45 MIN: CPT | Mod: S$GLB,,, | Performed by: OBSTETRICS & GYNECOLOGY

## 2017-01-27 PROCEDURE — 99999 PR PBB SHADOW E&M-EST. PATIENT-LVL III: CPT | Mod: PBBFAC,,, | Performed by: OBSTETRICS & GYNECOLOGY

## 2017-01-27 PROCEDURE — 1159F MED LIST DOCD IN RCRD: CPT | Mod: S$GLB,,, | Performed by: OBSTETRICS & GYNECOLOGY

## 2017-01-27 PROCEDURE — 88175 CYTOPATH C/V AUTO FLUID REDO: CPT

## 2017-01-27 PROCEDURE — 81025 URINE PREGNANCY TEST: CPT | Mod: S$GLB,,, | Performed by: OBSTETRICS & GYNECOLOGY

## 2017-01-27 PROCEDURE — 87591 N.GONORRHOEAE DNA AMP PROB: CPT

## 2017-01-27 PROCEDURE — 87086 URINE CULTURE/COLONY COUNT: CPT

## 2017-01-27 NOTE — PROGRESS NOTES
"CC: fibroids  Dysmenorrhea  Pelvic pain  History of endometrial ablation    Crista Diamond is a 41 y.o. female  presents for a menorrhagia, dysmenorrhea and pelvic pain.  She had a history of a thermachoice endometrial ablation.  She continue to have bleeding and concerns.  She has anemia.  She was checked and her TSH was normal.   CMP normal   She has questions regarding estrogen/ testosterone  Levels.         Past Medical History   Diagnosis Date    Asthma     Borderline anemia     Fibroids     Fibroids     Functional ovarian cysts        Past Surgical History   Procedure Laterality Date    D&c hysteroscopy with endometrial ablation         OB History    Para Term  AB SAB TAB Ectopic Multiple Living   1 1        1      # Outcome Date GA Lbr Chava/2nd Weight Sex Delivery Anes PTL Lv   1 Para 95    M Vag-Spont   Y          Family History   Problem Relation Age of Onset    Emphysema Mother     Hypertension Father     Cancer Sister     Breast cancer Neg Hx     Colon cancer Neg Hx     Ovarian cancer Neg Hx        Social History   Substance Use Topics    Smoking status: Never Smoker    Smokeless tobacco: None    Alcohol use No       Visit Vitals    /70    Ht 5' 2" (1.575 m)    Wt 66.7 kg (147 lb 0.8 oz)    LMP 2017    BMI 26.9 kg/m2       ROS:  GENERAL: Denies weight gain or weight loss. Feeling well overall.   SKIN: Denies rash or lesions.   HEAD: Denies head injury or headache.   NODES: Denies enlarged lymph nodes.   CHEST: Denies chest pain or shortness of breath.   CARDIOVASCULAR: Denies palpitations or left sided chest pain.   ABDOMEN: No abdominal pain, constipation, diarrhea, nausea, vomiting or rectal bleeding.   URINARY: No frequency, dysuria, hematuria, or burning on urination.  REPRODUCTIVE: See HPI.   BREASTS: The patient performs breast self-examination and denies pain, lumps, or nipple discharge.   HEMATOLOGIC: No easy bruisability or excessive " bleeding.  MUSCULOSKELETAL: Denies joint pain or swelling.   NEUROLOGIC: Denies syncope or weakness.   PSYCHIATRIC: Denies depression, anxiety or mood swings.    Physical Exam:    APPEARANCE: Well nourished, well developed, in no acute distress.  AFFECT: WNL, alert and oriented x 3  SKIN: No acne or hirsutism  NECK: Neck symmetric without masses or thyromegaly  NODES: No inguinal, cervical, axillary, or femoral lymph node enlargement  CHEST: Good respiratory effect  ABDOMEN: Soft.  No tenderness or masses.  No hepatosplenomegaly.  No hernias.  BREASTS: Symmetrical, no skin changes or visible lesions.  Right breast has 1 cm mobile mass superior to the nipple, NO palpable masses, nipple discharge bilaterally.  PELVIC: Normal external genitalia without lesions.  Normal hair distribution.  Adequate perineal body, normal urethral meatus.  Vagina moist and well rugated without lesions or discharge.  Cervix pink, without lesions, discharge or tenderness.  No significant cystocele or rectocele.  Bimanual exam shows uterus to be normal size, regular, mobile and nontender.  Adnexa without masses or tenderness.    EXTREMITIES: No edema.      ASSESSMENT AND PLAN  1. Uterine leiomyoma, unspecified location  POCT Urine Pregnancy    Urine culture    C. trachomatis/N. gonorrhoeae by AMP DNA Urine    US Pelvis Comp with Transvag NON-OB (xpd   2. Dysmenorrhea  POCT Urine Pregnancy    Urine culture    C. trachomatis/N. gonorrhoeae by AMP DNA Urine    US Pelvis Comp with Transvag NON-OB (xpd   3. S/P endometrial ablation  POCT Urine Pregnancy    Urine culture    C. trachomatis/N. gonorrhoeae by AMP DNA Urine   4. Encounter for gynecological examination without abnormal finding  Liquid-based pap smear, screening   5. Anemia, unspecified type     6. Lump of right breast  Mammo Digital Diagnostic Bilat with Tevin    US Breast Right Complete         Discussed fibroid treatment options- Possible UAE, VS myomectomy vs HYST. Treat menorrhagia  with OCPs, Lysteda or medical management.   Risks and benefits of fibroid procedures discussed.   Will consider options and notify the office once able to review options.   Consider options-  Will follow up accordingly    Patient was counseled today on A.C.S. Pap guidelines and recommendations for yearly pelvic exams, mammograms and monthly self breast exams; to see her PCP for other health maintenance.   F/U after US

## 2017-01-27 NOTE — MR AVS SNAPSHOT
[facility] [address]  [address]  [facility] [address]  Phone: [phone]  Fax: [phone]                  Crista Reyes Diamond   2017 8:45 AM   Office Visit    Description:  Female : 1975   Provider:  Rosio Elizabeth MD   Department:  [facility] [address]           Reason for Visit     Gynecologic Exam     Migraine     Menorrhagia     Fibroids     Pelvic Pain     Ovarian Cyst                To Do List           Future Appointments        Provider Department Dept Phone    2/3/2017 8:00 AM [facility] MRI1 [address] [facility] [phone]    2/3/2017 9:00 AM [facility] MRI1 [address] [facility] [phone]    3/28/2017 8:00 AM Christen Bishop NP [facility] [phone]      Goals (5 Years of Data)     None      [facility]     [facility] Care [facility] -  Assistance  Registered nurses in the [facility] provide clinical advisement, health education, appointment booking, and other advisory services.  Call for this free service at [phone].             Medications           Message regarding Medications     Verify the changes and/or additions to your medication regime listed below are the same as discussed with your clinician today.  If any of these changes or additions are incorrect, please notify your healthcare provider.             Verify that the below list of medications is an accurate representation of the medications you are currently taking.  If none reported, the list may be blank. If incorrect, please contact your healthcare provider. Carry this list with you in case of emergency.           Current Medications     ALBUTEROL INHL Inhale into the lungs.    butalbital-acetaminophen-caffeine -40 mg (FIORICET, ESGIC) -40 mg per tablet Take 1 tablet by mouth every 6 (six) hours as needed for Headaches.    ibuprofen (ADVIL,MOTRIN) 800 MG tablet Take 1 tablet (800 mg total)  "by mouth every 6 (six) hours as needed (Migraine Headaches).    ketoprofen (ORUDIS) 75 MG capsule Take 1 capsule (75 mg total) by mouth every 8 (eight) hours as needed for Pain.    ondansetron (ZOFRAN-ODT) 8 MG TbDL Take 1 tablet (8 mg total) by mouth every 12 (twelve) hours as needed.    topiramate (TOPAMAX) 25 MG tablet Take 1 tablet (25 mg total) by mouth as directed. 1 tab x 7d then 2 tabs qd x 7d, then 3 tabs qd thereafter           Clinical Reference Information           Vital Signs - Last Recorded  Most recent update: 1/27/2017  8:57 AM by Barbara Sarah MA    BP Ht Wt LMP BMI    120/70 5' 2" (1.575 m) 66.7 kg (147 lb 0.8 oz) 01/19/2017 26.9 kg/m2      Blood Pressure          Most Recent Value    BP  120/70      Allergies as of 1/27/2017     Sulfa (Sulfonamide Antibiotics)      Immunizations Administered on Date of Encounter - 1/27/2017     None      "

## 2017-01-28 LAB — BACTERIA UR CULT: NO GROWTH

## 2017-01-30 ENCOUNTER — TELEPHONE (OUTPATIENT)
Dept: SLEEP MEDICINE | Facility: CLINIC | Age: 42
End: 2017-01-30

## 2017-01-30 LAB
C TRACH DNA SPEC QL NAA+PROBE: NEGATIVE
N GONORRHOEA DNA SPEC QL NAA+PROBE: NEGATIVE

## 2017-01-30 NOTE — TELEPHONE ENCOUNTER
Notified of suboptimal Vit D (begin OTC supplement 1000u/d) and SANDRINE (begin OTC Fe supplement qd-bid if tolerable, look for one with colace and /or vit c). She has upcoming imaging studies this week.

## 2017-02-03 ENCOUNTER — HOSPITAL ENCOUNTER (OUTPATIENT)
Dept: RADIOLOGY | Facility: OTHER | Age: 42
Discharge: HOME | End: 2017-02-03
Attending: NURSE PRACTITIONER
Payer: MEDICAID

## 2017-02-03 DIAGNOSIS — G43.101 MIGRAINE WITH AURA AND WITH STATUS MIGRAINOSUS, NOT INTRACTABLE: ICD-10-CM

## 2017-02-03 PROCEDURE — 25500020 PHARM REV CODE 255: Performed by: NURSE PRACTITIONER

## 2017-02-03 PROCEDURE — 70544 MR ANGIOGRAPHY HEAD W/O DYE: CPT | Mod: TC

## 2017-02-03 PROCEDURE — 70553 MRI BRAIN STEM W/O & W/DYE: CPT | Mod: TC

## 2017-02-03 PROCEDURE — 70553 MRI BRAIN STEM W/O & W/DYE: CPT | Mod: 26,,, | Performed by: RADIOLOGY

## 2017-02-03 PROCEDURE — A9585 GADOBUTROL INJECTION: HCPCS | Performed by: NURSE PRACTITIONER

## 2017-02-03 RX ORDER — GADOBUTROL 604.72 MG/ML
6.5 INJECTION INTRAVENOUS
Status: COMPLETED | OUTPATIENT
Start: 2017-02-03 | End: 2017-02-03

## 2017-02-03 RX ADMIN — GADOBUTROL 6.5 ML: 604.72 INJECTION INTRAVENOUS at 09:02

## 2017-02-07 ENCOUNTER — TELEPHONE (OUTPATIENT)
Dept: SLEEP MEDICINE | Facility: OTHER | Age: 42
End: 2017-02-07

## 2017-02-17 ENCOUNTER — TELEPHONE (OUTPATIENT)
Dept: SLEEP MEDICINE | Facility: CLINIC | Age: 42
End: 2017-02-17

## 2017-02-17 NOTE — TELEPHONE ENCOUNTER
----- Message from Rosio Cowart sent at 2/17/2017  3:23 PM CST -----  Contact: pt [phone]   _  1st Request  _  2nd Request  _  3rd Request        Who: pt [phone]     Why: she needs to know which vitamins and the mg to get. Please call the pt    What Number to Call Back:pt [phone]     When to Expect a call back: (Before the end of the day)   -- if the call is after 12:00, the call back will be tomorrow.

## 2017-02-23 ENCOUNTER — HOSPITAL ENCOUNTER (OUTPATIENT)
Dept: RADIOLOGY | Facility: OTHER | Age: 42
Discharge: HOME | End: 2017-02-23
Attending: OBSTETRICS & GYNECOLOGY
Payer: MEDICAID

## 2017-02-23 DIAGNOSIS — N63.10 LUMP OF RIGHT BREAST: ICD-10-CM

## 2017-02-23 DIAGNOSIS — D25.9 UTERINE LEIOMYOMA, UNSPECIFIED LOCATION: ICD-10-CM

## 2017-02-23 DIAGNOSIS — N94.6 DYSMENORRHEA: ICD-10-CM

## 2017-02-23 PROCEDURE — 76856 US EXAM PELVIC COMPLETE: CPT | Mod: TC

## 2017-02-23 PROCEDURE — 77062 BREAST TOMOSYNTHESIS BI: CPT | Mod: 26,,, | Performed by: RADIOLOGY

## 2017-02-23 PROCEDURE — 76642 ULTRASOUND BREAST LIMITED: CPT | Mod: TC,50,RT

## 2017-02-23 PROCEDURE — 76830 TRANSVAGINAL US NON-OB: CPT | Mod: 26,,, | Performed by: RADIOLOGY

## 2017-02-23 PROCEDURE — 77066 DX MAMMO INCL CAD BI: CPT | Mod: 26,,, | Performed by: RADIOLOGY

## 2017-02-23 PROCEDURE — 76856 US EXAM PELVIC COMPLETE: CPT | Mod: 26,,, | Performed by: RADIOLOGY

## 2017-02-23 PROCEDURE — 77066 DX MAMMO INCL CAD BI: CPT | Mod: TC

## 2017-02-23 PROCEDURE — 76642 ULTRASOUND BREAST LIMITED: CPT | Mod: 26,LT,, | Performed by: RADIOLOGY

## 2017-02-23 PROCEDURE — 76642 ULTRASOUND BREAST LIMITED: CPT | Mod: 26,RT,, | Performed by: RADIOLOGY

## 2017-03-06 ENCOUNTER — TELEPHONE (OUTPATIENT)
Dept: SLEEP MEDICINE | Facility: OTHER | Age: 42
End: 2017-03-06

## 2017-03-08 ENCOUNTER — TELEPHONE (OUTPATIENT)
Dept: SLEEP MEDICINE | Facility: OTHER | Age: 42
End: 2017-03-08

## 2017-03-08 NOTE — TELEPHONE ENCOUNTER
Patient no showed for her home sleep study on March 7th.  Called home number her  told me to call her cell.  Called cell,voicemail is not set up.  Not able to leave a message to reschedule.

## 2017-03-13 ENCOUNTER — TELEPHONE (OUTPATIENT)
Dept: SLEEP MEDICINE | Facility: OTHER | Age: 42
End: 2017-03-13

## 2017-03-17 ENCOUNTER — TELEPHONE (OUTPATIENT)
Dept: SLEEP MEDICINE | Facility: OTHER | Age: 42
End: 2017-03-17

## 2017-03-22 ENCOUNTER — PATIENT MESSAGE (OUTPATIENT)
Dept: ADMINISTRATIVE | Facility: OTHER | Age: 42
End: 2017-03-22

## 2017-03-22 ENCOUNTER — TELEPHONE (OUTPATIENT)
Dept: SLEEP MEDICINE | Facility: OTHER | Age: 42
End: 2017-03-22

## 2017-03-22 NOTE — TELEPHONE ENCOUNTER
Patient canceled her home sleep study on march 7th.  Left messages to reschedule.  No response,sent out a message through [facility] to schedule.

## 2017-04-03 ENCOUNTER — TELEPHONE (OUTPATIENT)
Dept: SLEEP MEDICINE | Facility: OTHER | Age: 42
End: 2017-04-03

## 2017-04-03 NOTE — TELEPHONE ENCOUNTER
Left message on home and cell ,also sent a message through [facility] to reschedule.  No response.

## 2019-05-05 ENCOUNTER — HOSPITAL ENCOUNTER (EMERGENCY)
Facility: OTHER | Age: 44
Discharge: HOME | End: 2019-05-05
Attending: EMERGENCY MEDICINE

## 2019-05-05 VITALS
BODY MASS INDEX: 25.4 KG/M2 | HEIGHT: 62 IN | SYSTOLIC BLOOD PRESSURE: 139 MMHG | WEIGHT: 138 LBS | TEMPERATURE: 98 F | RESPIRATION RATE: 19 BRPM | DIASTOLIC BLOOD PRESSURE: 74 MMHG | HEART RATE: 81 BPM | OXYGEN SATURATION: 100 %

## 2019-05-05 DIAGNOSIS — T14.90XA TRAUMA: Primary | ICD-10-CM

## 2019-05-05 DIAGNOSIS — S52.125A CLOSED NONDISPLACED FRACTURE OF HEAD OF LEFT RADIUS, INITIAL ENCOUNTER: ICD-10-CM

## 2019-05-05 DIAGNOSIS — S42.292A HUMERAL HEAD FRACTURE, LEFT, CLOSED, INITIAL ENCOUNTER: ICD-10-CM

## 2019-05-05 DIAGNOSIS — T07.XXXA MULTIPLE ABRASIONS: ICD-10-CM

## 2019-05-05 DIAGNOSIS — T07.XXXA MULTIPLE CONTUSIONS: ICD-10-CM

## 2019-05-05 LAB
B-HCG UR QL: NEGATIVE
CTP QC/QA: YES

## 2019-05-05 PROCEDURE — 99285 EMERGENCY DEPT VISIT HI MDM: CPT

## 2019-05-05 PROCEDURE — 25000003 PHARM REV CODE 250: Performed by: NURSE PRACTITIONER

## 2019-05-05 PROCEDURE — 29105 APPLICATION LONG ARM SPLINT: CPT | Mod: LT

## 2019-05-05 PROCEDURE — 81025 URINE PREGNANCY TEST: CPT | Performed by: EMERGENCY MEDICINE

## 2019-05-05 RX ORDER — HYDROCODONE BITARTRATE AND ACETAMINOPHEN 5; 325 MG/1; MG/1
1 TABLET ORAL
Status: COMPLETED | OUTPATIENT
Start: 2019-05-05 | End: 2019-05-05

## 2019-05-05 RX ORDER — NAPROXEN 500 MG/1
500 TABLET ORAL
Status: COMPLETED | OUTPATIENT
Start: 2019-05-05 | End: 2019-05-05

## 2019-05-05 RX ORDER — PROPRANOLOL HYDROCHLORIDE 10 MG/1
10 TABLET ORAL 3 TIMES DAILY
COMMUNITY

## 2019-05-05 RX ORDER — HYDROCODONE BITARTRATE AND ACETAMINOPHEN 5; 325 MG/1; MG/1
1 TABLET ORAL EVERY 4 HOURS PRN
Qty: 30 TABLET | Refills: 0 | Status: SHIPPED | OUTPATIENT
Start: 2019-05-05 | End: 2019-05-10

## 2019-05-05 RX ORDER — NAPROXEN 500 MG/1
500 TABLET ORAL 2 TIMES DAILY WITH MEALS
Qty: 10 TABLET | Refills: 0 | Status: SHIPPED | OUTPATIENT
Start: 2019-05-05 | End: 2019-05-10

## 2019-05-05 RX ADMIN — HYDROCODONE BITARTRATE AND ACETAMINOPHEN 1 TABLET: 5; 325 TABLET ORAL at 07:05

## 2019-05-05 RX ADMIN — NAPROXEN 500 MG: 500 TABLET ORAL at 08:05

## 2019-05-05 NOTE — ED PROVIDER NOTES
Encounter Date: 5/5/2019       History     Chief Complaint   Patient presents with    Fall     8 feet from ladder. hit chin, left arm, right wrist, left knee pain. denies LOC. no obvious deformities, pt ambulatory with limp      Pt is a 44 yo female with medical history of asthma, anemia, fibroids presents to the ED after a slip and fall from a ladder.  Patient states she was on an 8 ft up on a ladder when the ladder slipped and she slid down the ladder.  Patient states when the ladder hit the ground she then landed on top of it.  Patient denies loss of consciousness.  Patient states she hit her chin on 1 of the ladder rungs.  Patient states she landed on top of the ladder and braced herself with her left forearm and left knee.  Patient denies any loss of consciousness, C-spine tenderness, difficulty ambulating.  Patient states her tetanus is up-to-date.    The history is provided by the patient.     Review of patient's allergies indicates:   Allergen Reactions    Sulfa (sulfonamide antibiotics) Anaphylaxis     Past Medical History:   Diagnosis Date    Asthma     Borderline anemia     Fibroids     Fibroids     Functional ovarian cysts      Past Surgical History:   Procedure Laterality Date    D&C Hysteroscopy with Endometrial Ablation  2012     Family History   Problem Relation Age of Onset    Emphysema Mother     Hypertension Father     Cancer Sister     Breast cancer Neg Hx     Colon cancer Neg Hx     Ovarian cancer Neg Hx      Social History     Tobacco Use    Smoking status: Never Smoker   Substance Use Topics    Alcohol use: No    Drug use: No     Review of Systems   Constitutional: Negative for chills, fatigue and fever.   HENT: Negative for congestion and sore throat.    Eyes: Negative for photophobia and visual disturbance.   Respiratory: Negative for cough, chest tightness, shortness of breath and wheezing.    Cardiovascular: Negative for chest pain, palpitations and leg swelling.    Gastrointestinal: Negative for abdominal pain, constipation, diarrhea, nausea and vomiting.   Endocrine: Negative.    Genitourinary: Negative for decreased urine volume, difficulty urinating, dysuria, frequency and urgency.   Musculoskeletal: Positive for arthralgias ( left elbow, left knee) and myalgias ( left forearm, left shin). Negative for back pain, gait problem, joint swelling, neck pain and neck stiffness.   Skin: Negative for rash.   Allergic/Immunologic: Negative.    Neurological: Positive for headaches. Negative for dizziness, seizures, syncope, weakness and numbness.   Hematological: Does not bruise/bleed easily.       Physical Exam     Initial Vitals [05/05/19 1851]   BP Pulse Resp Temp SpO2   (!) 147/78 74 16 98.3 °F (36.8 °C) 100 %      MAP       --         Physical Exam    Nursing note and vitals reviewed.  Constitutional: Vital signs are normal. She appears well-developed and well-nourished. She is cooperative. She does not have a sickly appearance. She does not appear ill. She appears distressed ( due to pain).   HENT:   Head: Normocephalic. Head is with laceration. Head is without raccoon's eyes, without Christine's sign, without abrasion and without contusion.   Nose: Nose normal.   Eyes: Conjunctivae, EOM and lids are normal. Pupils are equal, round, and reactive to light.   Neck: Trachea normal, normal range of motion, full passive range of motion without pain and phonation normal. Neck supple. No spinous process tenderness and no muscular tenderness present. No JVD present.       Cardiovascular: Normal rate and regular rhythm.   Pulses:       Radial pulses are 2+ on the right side, and 2+ on the left side.        Dorsalis pedis pulses are 2+ on the right side, and 2+ on the left side.   Pulmonary/Chest: Effort normal and breath sounds normal.   Abdominal: Soft. Normal appearance and bowel sounds are normal. There is no tenderness. There is no rigidity, no rebound and no guarding.    Musculoskeletal: Normal range of motion.        Left shoulder: Normal.        Left elbow: She exhibits swelling. She exhibits no laceration. Tenderness found.        Left wrist: Normal.        Left knee: She exhibits swelling, ecchymosis and laceration ( abrasion). Tenderness found.        Cervical back: Normal.        Thoracic back: Normal.        Lumbar back: Normal.        Left upper arm: She exhibits tenderness, bony tenderness and swelling. She exhibits no edema, no deformity and no laceration.        Left forearm: She exhibits tenderness, bony tenderness and swelling. She exhibits no deformity and no laceration.        Left lower leg: She exhibits tenderness and bony tenderness. She exhibits no deformity and no laceration.        Legs:  Neurological: She is alert and oriented to person, place, and time. No sensory deficit. She displays a negative Romberg sign. GCS eye subscore is 4. GCS verbal subscore is 5. GCS motor subscore is 6.   Strength 3/5 in left upper extremity.  Good range of motion at elbow and wrist.  Strength 5/5 in right upper extremity.  Strength 5/5 in bilateral lower extremities.   Skin: Skin is warm and dry. Capillary refill takes 2 to 3 seconds. Abrasion (Scattered), ecchymosis ( Scattered) and laceration ( 1.5 cm laceration under chin) noted. No rash noted. No cyanosis. No pallor. Nails show no clubbing.         ED Course   Splint Application  Date/Time: 5/5/2019 8:52 PM  Performed by: Selina Walker NP  Authorized by: Nneka Telles MD   Consent Done: Emergent Situation  Location details: left arm  Splint type: long arm  Supplies used: Ortho-Glass and cotton padding  Post-procedure: The splinted body part was neurovascularly unchanged following the procedure.  Patient tolerance: Patient tolerated the procedure well with no immediate complications  Comments: Splint applied by nursing staff.        Labs Reviewed   POCT URINE PREGNANCY          Imaging Results          X-Ray Tibia  Fibula 2 View Left (Final result)  Result time 05/05/19 19:58:45    Final result by Miguel Angel Calderón MD (05/05/19 19:58:45)                 Impression:      As above      Electronically signed by: Miguel Angel Calderón MD  Date:    05/05/2019  Time:    19:58             Narrative:    EXAMINATION:  XR TIBIA FIBULA 2 VIEW LEFT    CLINICAL HISTORY:  Injury, unspecified, initial encounter    TECHNIQUE:  AP and lateral views of the left tibia and fibula were performed.    COMPARISON:  None.    FINDINGS:  Two views.    No acute displaced fracture or dislocation of the tibia or fibula.  No radiopaque foreign body.  The ankle appears intact.                               X-Ray Knee 3 View Left (Final result)  Result time 05/05/19 19:58:18    Final result by Miguel Angel Calderón MD (05/05/19 19:58:18)                 Impression:      1. No acute displaced fracture or dislocation of the knee.      Electronically signed by: Miguel Angel Calderón MD  Date:    05/05/2019  Time:    19:58             Narrative:    EXAMINATION:  XR KNEE 3 VIEW LEFT    CLINICAL HISTORY:  Injury, unspecified, initial encounter    TECHNIQUE:  AP, lateral, and Merchant views of the left knee were performed.    COMPARISON:  None    FINDINGS:  Three views.    No acute displaced fracture or dislocation of the knee.  No radiopaque foreign body.  No large knee joint effusion.                               X-Ray Humerus 2 View Left (Final result)  Result time 05/05/19 19:57:54    Final result by Miguel Angel Calderón MD (05/05/19 19:57:54)                 Impression:      As above      Electronically signed by: Miguel Angel Calderón MD  Date:    05/05/2019  Time:    19:57             Narrative:    EXAMINATION:  XR HUMERUS 2 VIEW LEFT    CLINICAL HISTORY:  Injury, unspecified, initial encounter    COMPARISON:  None    FINDINGS:  Two views.    No acute displaced fracture or dislocation of the humerus.  No radiopaque foreign body.  The shoulder appears intact.  The left  acromioclavicular joint is grossly intact.  No visualized acute displaced rib fracture.  Please see separate report for details of the radial head.                               X-Ray Forearm Left (Final result)  Result time 05/05/19 19:57:23    Final result by Miguel Angel Calderón MD (05/05/19 19:57:23)                 Impression:      1. Radial head fracture described in separate report, the remaining aspects of the radius and ulna appear intact.      Electronically signed by: Miguel Angel Calderón MD  Date:    05/05/2019  Time:    19:57             Narrative:    EXAMINATION:  XR FOREARM LEFT    CLINICAL HISTORY:  Injury, unspecified, initial encounter    TECHNIQUE:  AP and lateral views of the left forearm were performed.    COMPARISON:  None    FINDINGS:  Two views.    Radial head fracture described in separate report.  The remaining aspects of the radius and ulna appear intact.                               X-Ray Elbow Complete Left (Final result)  Result time 05/05/19 19:56:53    Final result by Miguel Angel Calderón MD (05/05/19 19:56:53)                 Impression:      1. Radial head fracture as above.      Electronically signed by: Miguel Angel Calderón MD  Date:    05/05/2019  Time:    19:56             Narrative:    EXAMINATION:  XR ELBOW COMPLETE 3 VIEW LEFT    CLINICAL HISTORY:  Injury, unspecified, initial encounter    TECHNIQUE:  AP, lateral, and oblique views of the left elbow were performed.    COMPARISON:  None    FINDINGS:  Four views.    There is abnormal elevation of the anterior and posterior elbow fat pads.  There is an impacted fracture of the left humeral head.  Fracture plane involves the articular surface.  There is minimal fracture fragment displacement.  No dislocation.  No radiopaque foreign body.                                 Medical Decision Making:   Initial Assessment:   Emergent evaluation of a 42 yo female patient presenting to the ER with chief complaint of fall from a ladder.  Patient states  she was painting and slipped the ladder slid from underneath her.  Patient states she slid down the ladder and then fell when the ladder hit the ground.  Patient states she landed on top of the ladder.  Patient states she braced herself with her left forearm and left shin.  On exam patient is A&O x3. Patient is not febrile nontoxic-appearing.  Patient is distressed due to pain.  Pupils equal round reactive 3-2 mm.  1.5 cm laceration noted under her chin.  Bleeding is controlled prior to coming to the ED.  Patient states tetanus is up-to-date.  Breath sounds clear bilaterally.  No chest wall pain to palpation noted.  Abdomen soft and nontender.  Bowel sounds within normal limits.  Tenderness to palpation to left forearm.  Strength 3/5 in left hand.  Good range of motion to wrist and shoulder.  Tenderness to palpation elbow.  Ecchymosis and swelling noted to forearm and elbow.  No abrasions or contusions to right upper extremity.  Left lower extremity has abrasions and swelling.  Tenderness to palpation noted.  Strength 5/5 in bilateral lower extremities. Plus two radial and DP pulses noted.  Differential Diagnosis:   Differential diagnoses include but are not limited to musculoskeletal strain, sprain, skeletal fracture, ligament injury, head injuries, concussion, facial fractures, contusions, abrasions or lacerations.  Clinical Tests:   Lab Tests: Ordered and Reviewed  The following lab test(s) were unremarkable: UPT       <> Summary of Lab: Urine pregnancy negative.  Radiological Study: Ordered and Reviewed  ED Management:  I will get imaging, medicate and reassess.      Patient's left tib-fib shows no acute fracture or dislocation.  Ankle is intact.  Left knee shows no acute fracture or dislocation.  No effusion noted.  Left elbow x-ray shows radial head fracture and impacted fracture of the left humeral head.    Splint placed by nursing staff.  Patient neurovascularly intact. Patient advised to follow up with  Orthopedic in 1 week for evaluation.  Patient verbalized understanding of this plan of care.  All questions and concerns addressed.  Patient is hemodynamically stable, vital signs are normal. Discharge instructions given. Prescription for Norco and naproxen given and explained. Return to ED precautions discussed. Follow up as directed. Pt verbalized understanding of this plan. Pt is stable for discharge.                         Clinical Impression:       ICD-10-CM ICD-9-CM   1. Trauma T14.90XA 959.9   2. Multiple contusions T07.XXXA 924.8   3. Multiple abrasions T07.XXXA 919.0   4. Humeral head fracture, left, closed, initial encounter S42.292A 812.09   5. Closed nondisplaced fracture of head of left radius, initial encounter S52.125A 813.05         Disposition:   Disposition: Discharged  Condition: Stable          Selina Walker NP  05/05/19 2052

## 2019-05-06 NOTE — ED NOTES
ICE PACK:  Applied ice pack to L arm and L lower leg. Pt tolerating well. Bed locked in low position and call light in within reach. Will continue to monitor.

## 2019-05-06 NOTE — ED NOTES
Appearance: Pt awake, alert & oriented to person, place & time. Pt in no acute distress at present time. Pt is clean and well groomed with clothes appropriately fastened.   Skin: Skin warm, dry & intact. Color consistent with ethnicity. Mucous membranes moist. No breakdown or brusing noted.   Musculoskeletal: Patient moving all extremities OK, ambulates with slow steady gait. No deformities noted. Pt with laceration and mild swelling to L chin. Pt with mild swelling and decreased ROM to L elbow. Pt with abrasion to L anterior knee with pain to L anterior shin.   Respiratory: Respirations spontaneous, even, and non-labored. Visible chest rise noted. Airway is open and patent. No accessory muscle use noted.   Neurologic: Sensation is intact. Speech is clear and appropriate. Eyes open spontaneously, behavior appropriate to situation, follows commands,  purposeful motor response noted.   Cardiac:  No Bilateral lower extremity edema. Cap refill is <3 seconds. Pt denies active chest pains, SOB, dizziness, blurred vision, weakness or fatigue at this time.   Abdomen: Pt denies active abd pains, cramping or discomfort, No N/V/D at this time.

## 2019-05-06 NOTE — ED NOTES
Pt to ED, reporting she slid down from ladder PTA, denies LOC with fall. Pt AAOx4 and appropriate at this time. Respirations even and unlabored. No acute distress noted.

## 2019-05-06 NOTE — DISCHARGE INSTRUCTIONS
Your imaging shows that you have a humeral head fracture.  We will apply a splint.  Please follow up with Orthopedics in the next week for evaluation.  We have prescribed you Norco for pain.      Our goal in the emergency department is to always give you outstanding care and exceptional service. You may receive a survey by mail or e-mail in the next week regarding your experience in our ED. We would greatly appreciate your completing and returning the survey. Your feedback provides us with a way to recognize our staff who give very good care and it helps us learn how to improve when your experience was below our aspiration of excellence.

## 2019-05-15 ENCOUNTER — HOSPITAL ENCOUNTER (EMERGENCY)
Facility: HOSPITAL | Age: 44
Discharge: HOME | End: 2019-05-15
Attending: EMERGENCY MEDICINE

## 2019-05-15 VITALS
WEIGHT: 138 LBS | OXYGEN SATURATION: 99 % | HEIGHT: 62 IN | TEMPERATURE: 98 F | RESPIRATION RATE: 15 BRPM | DIASTOLIC BLOOD PRESSURE: 63 MMHG | SYSTOLIC BLOOD PRESSURE: 151 MMHG | HEART RATE: 75 BPM | BODY MASS INDEX: 25.4 KG/M2

## 2019-05-15 DIAGNOSIS — S42.402A LEFT ELBOW FRACTURE: Primary | ICD-10-CM

## 2019-05-15 PROCEDURE — 29105 PR APPLY LONG ARM SPLINT: ICD-10-PCS | Mod: LT,,, | Performed by: EMERGENCY MEDICINE

## 2019-05-15 PROCEDURE — 29105 APPLICATION LONG ARM SPLINT: CPT | Mod: LT

## 2019-05-15 PROCEDURE — 25000003 PHARM REV CODE 250: Performed by: EMERGENCY MEDICINE

## 2019-05-15 PROCEDURE — 99283 EMERGENCY DEPT VISIT LOW MDM: CPT | Mod: 25,,, | Performed by: EMERGENCY MEDICINE

## 2019-05-15 PROCEDURE — 29105 APPLICATION LONG ARM SPLINT: CPT | Mod: LT,,, | Performed by: EMERGENCY MEDICINE

## 2019-05-15 PROCEDURE — 99283 PR EMERGENCY DEPT VISIT,LEVEL III: ICD-10-PCS | Mod: 25,,, | Performed by: EMERGENCY MEDICINE

## 2019-05-15 PROCEDURE — 99283 EMERGENCY DEPT VISIT LOW MDM: CPT | Mod: 25

## 2019-05-15 RX ORDER — ONDANSETRON 4 MG/1
4 TABLET, FILM COATED ORAL EVERY 6 HOURS PRN
Qty: 12 TABLET | Refills: 0 | Status: SHIPPED | OUTPATIENT
Start: 2019-05-15

## 2019-05-15 RX ORDER — HYDROCODONE BITARTRATE AND ACETAMINOPHEN 5; 325 MG/1; MG/1
1 TABLET ORAL EVERY 4 HOURS PRN
Qty: 12 TABLET | Refills: 0 | Status: SHIPPED | OUTPATIENT
Start: 2019-05-15

## 2019-05-15 RX ORDER — HYDROCODONE BITARTRATE AND ACETAMINOPHEN 10; 325 MG/1; MG/1
1 TABLET ORAL
Status: COMPLETED | OUTPATIENT
Start: 2019-05-15 | End: 2019-05-15

## 2019-05-15 RX ADMIN — HYDROCODONE BITARTRATE AND ACETAMINOPHEN 1 TABLET: 10; 325 TABLET ORAL at 07:05

## 2019-05-15 NOTE — ED PROVIDER NOTES
"Encounter Date: 5/15/2019    SCRIBE #1 NOTE: I, Allyssa Marino, am scribing for, and in the presence of,  Dr. Maldonado. I have scribed the entire note.       History     Chief Complaint   Patient presents with    Arm Pain     states fractured arm 10 days ago, having trouble getting referral and wants arm checked/ states swelling is "better", states pain is manageable      43 year old female with a past medical history of asthma, fibroids, migraines, and recent left elbow fracture, presents with a chief complaint of left arm pain. On 5/5/2019 patient sustained a fall from a ladder. Xray from 5/5/2019 revealed impacted fracture of humeral head and radius. She reports she has been unable to follow up with orthopedics since that time. She has been using a splint. Patient has gone through her norco with nausea and vomiting; she states ibuprofen helps somewhat with the pain. She describes her pain as throbbing that is mostly over the elbow and occasionally radiating to the forearm. She endorses occasional paraesthesias to 4th and 5th digits. No new trauma but a dog did jump on her splint over the past week. Her swelling has been intermittent but improved today.     The history is provided by the patient.     Review of patient's allergies indicates:   Allergen Reactions    Sulfa (sulfonamide antibiotics) Anaphylaxis     Past Medical History:   Diagnosis Date    Asthma     Borderline anemia     Fibroids     Fibroids     Functional ovarian cysts      Past Surgical History:   Procedure Laterality Date    D&C Hysteroscopy with Endometrial Ablation  2012     Family History   Problem Relation Age of Onset    Emphysema Mother     Hypertension Father     Cancer Sister     Breast cancer Neg Hx     Colon cancer Neg Hx     Ovarian cancer Neg Hx      Social History     Tobacco Use    Smoking status: Never Smoker   Substance Use Topics    Alcohol use: No    Drug use: No     Review of Systems   Constitutional: Negative " for fever.   HENT: Negative for sore throat.    Eyes: Negative for visual disturbance.   Respiratory: Negative for shortness of breath.    Cardiovascular: Negative for chest pain.   Gastrointestinal: Negative for abdominal pain.   Genitourinary: Negative for dysuria.   Musculoskeletal:        Positive for left arm pain.    Skin: Negative for rash.   Neurological: Negative for weakness.        Positive for paraesthesias to left 4th and 5th fingers.        Physical Exam     Initial Vitals [05/15/19 1739]   BP Pulse Resp Temp SpO2   (!) 151/63 75 15 98 °F (36.7 °C) 99 %      MAP       --         Physical Exam    Nursing note and vitals reviewed.  Constitutional: She appears well-developed and well-nourished. She is not diaphoretic. No distress.   HENT:   Head: Normocephalic and atraumatic.   Cardiovascular: Normal rate.   Peripheral pulses intact.    Pulmonary/Chest: No respiratory distress.   Musculoskeletal:   Patient wearing a sling and splint to left upper extremity. It was removed and ortho glass appears intact.   Left arm: Good ROM of fingers. No significant edema. Swelling of elbow and proximal forearm.    Neurological: She is alert.   Skin:   Brisk capillary refill. No significant ecchymosis to left arm.          ED Course   Splint Application  Date/Time: 5/15/2019 9:58 PM  Performed by: Manjeet Maldonado MD  Authorized by: Manjeet Maldonado MD   Location details: left arm  Splint type: long arm  Post-procedure: The splinted body part was neurovascularly unchanged following the procedure.  Patient tolerance: Patient tolerated the procedure well with no immediate complications        Labs Reviewed - No data to display       Imaging Results          X-Ray Elbow Complete Left (Final result)  Result time 05/15/19 19:44:29    Final result by Miguel Angel Calderón MD (05/15/19 19:44:29)                 Impression:      1. Prior radial head fracture, with possibly increased displacement as compared to the examination  05/05/2019.  No definite new fracture.  2. Radiopaque structure projects over the radial head on the lateral view, not convincingly seen on subsequent views, may reflect material extraneous to the patient however avulsion fragment is not excluded, overall evaluation is limited secondary to overlying splint material.      Electronically signed by: Miguel Angel Calderón MD  Date:    05/15/2019  Time:    19:44             Narrative:    EXAMINATION:  XR ELBOW COMPLETE 3 VIEW LEFT    CLINICAL HISTORY:  Unspecified fracture of lower end of left humerus, initial encounter for closed fracture    TECHNIQUE:  AP, lateral, and oblique views of the left elbow were performed.    COMPARISON:  05/05/2019    FINDINGS:  Four views.    There is splint material, limiting evaluation of the elbow.    The anterior humeral line and radiocapitellar line are in grossly appropriate orientation.  There is fracture of the radial head primarily anteriorly, noting increased displacement as compared to the previous exam.  On the lateral view, there is a radiopaque structure projected over the region, possibly external to the patient as this is not confirmed on orthogonal imaging.  Differential would include avulsion fragment.                                 Medical Decision Making:   History:   Old Medical Records: I decided to obtain old medical records.  Initial Assessment:   43 year old female with a past medical history of asthma, fibroids, migraines, and recent left elbow fracture, presents with a chief complaint of left arm pain.   Differential Diagnosis:   My initial differential diagnoses include but are not limited to: elbow fracture, impaired wound healing, compartment syndrome, neuropathy.   Clinical Tests:   Radiological Study: Ordered and Reviewed  ED Management:  Splint taken down as above with no signs of compartment syndrome or neurovascular compromise. Splint was reapplied without difficulty. Will obtain repeat xrays, administer  analgesics, and instruct follow up with orthopedics. Patient provided with instructions to RICE and return precautions.     Reassessment:   Xrays reveal possible increased displacement of radial head fracture. Patient informed on the importance of follow up with orthopedics. Short course of norco provided with return precautions.             Scribe Attestation:   Scribe #1: I performed the above scribed service and the documentation accurately describes the services I performed. I attest to the accuracy of the note.    Attending Attestation:           Physician Attestation for Scribe:      Comments: I, Dr. Manjeet Maldonado, personally performed the services described in this documentation. All medical record entries made by the scribe were at my direction and in my presence.  I have reviewed the chart and agree that the record reflects my personal performance and is accurate and complete. Manjeet Maldonado MD.  10:00 PM 05/15/2019                 Clinical Impression:       ICD-10-CM ICD-9-CM   1. Left elbow fracture S42.402A 812.40         Disposition:   Disposition: Discharged  Condition: Stable                        Manjeet Maldonado MD  05/15/19 2203

## 2019-05-15 NOTE — ED TRIAGE NOTES
"Crista Diamond, a 43 y.o. female presents to the ED w/ complaint of left arm pain, Patient fell off ladder 2 weeks ago. The pain has become worse into the left arm. Patient had on/off edema and tingling to the left hand    Patient also has an on/off headache and nausea    Patient took Ibuprofen with no relief.    Patient is concerned about the increase in pain, and tingling in the fingers. Also reports that arm gets hot and throbbing    Triage note:  Chief Complaint   Patient presents with    Arm Pain     states fractured arm 10 days ago, having trouble getting referral and wants arm checked/ states swelling is "better", states pain is manageable     Review of patient's allergies indicates:   Allergen Reactions    Sulfa (sulfonamide antibiotics) Anaphylaxis     Past Medical History:   Diagnosis Date    Asthma     Borderline anemia     Fibroids     Fibroids     Functional ovarian cysts        "

## 2019-05-16 NOTE — ED NOTES
Patient identifiers verified and correct for Crista Diamond.    LOC: The patient is awake, alert and aware of environment with an appropriate affect, the patient is oriented x 3 and speaking appropriately.    APPEARANCE: Patient resting comfortably and in no acute distress, patient is clean and well groomed, patient's clothing is properly fastened.    SKIN: The skin is warm and dry, color consistent with ethnicity, patient has normal skin turgor and moist mucus membranes, skin intact, no breakdown or bruising noted.    MUSCULOSKELETAL: Patient moving all extremities spontaneously, Left arm splint/sling-- apple to move fingers, cms intact     RESPIRATORY: Airway is open and patent, respirations are spontaneous, patient has a normal effort and rate, no accessory muscle use noted, bilateral breath sounds clear    CARDIAC: Patient has a normal rate and regular rhythm, no periphreal edema noted, capillary refill < 3 seconds.    ABDOMEN: Soft and non tender to palpation, no distention noted, normoactive bowel sounds present in all four quadrants.    NEUROLOGIC: PERRLA. 3 mm bilaterally, eyes open spontaneously, behavior appropriate to situation, follows commands, facial expression symmetrical, bilateral hand grasp equal and even, purposeful motor response noted